# Patient Record
Sex: MALE | Race: WHITE | NOT HISPANIC OR LATINO | Employment: OTHER | URBAN - NONMETROPOLITAN AREA
[De-identification: names, ages, dates, MRNs, and addresses within clinical notes are randomized per-mention and may not be internally consistent; named-entity substitution may affect disease eponyms.]

---

## 2023-01-16 RX ORDER — DILTIAZEM HYDROCHLORIDE 120 MG/1
120 CAPSULE, EXTENDED RELEASE ORAL 2 TIMES DAILY
COMMUNITY

## 2023-01-16 RX ORDER — ASPIRIN 81 MG/1
81 TABLET ORAL DAILY
COMMUNITY
End: 2023-01-26

## 2023-01-16 RX ORDER — FUROSEMIDE 20 MG/1
20 TABLET ORAL DAILY
COMMUNITY

## 2023-01-16 RX ORDER — SIMVASTATIN 5 MG
5 TABLET ORAL NIGHTLY
COMMUNITY

## 2023-01-16 RX ORDER — OLMESARTAN MEDOXOMIL 20 MG/1
20 TABLET ORAL DAILY
COMMUNITY

## 2023-01-16 RX ORDER — METOPROLOL SUCCINATE 50 MG/1
50 TABLET, EXTENDED RELEASE ORAL 2 TIMES DAILY
COMMUNITY

## 2023-01-16 RX ORDER — SPIRONOLACTONE 25 MG/1
12.5 TABLET ORAL DAILY
COMMUNITY

## 2023-01-16 RX ORDER — DIGOXIN 250 MCG
250 TABLET ORAL
COMMUNITY

## 2023-01-26 ENCOUNTER — OFFICE VISIT (OUTPATIENT)
Dept: CARDIOLOGY | Facility: CLINIC | Age: 73
End: 2023-01-26
Payer: MEDICARE

## 2023-01-26 VITALS
BODY MASS INDEX: 24.2 KG/M2 | HEART RATE: 76 BPM | DIASTOLIC BLOOD PRESSURE: 66 MMHG | WEIGHT: 169 LBS | SYSTOLIC BLOOD PRESSURE: 118 MMHG | OXYGEN SATURATION: 98 % | HEIGHT: 70 IN

## 2023-01-26 DIAGNOSIS — I10 PRIMARY HYPERTENSION: ICD-10-CM

## 2023-01-26 DIAGNOSIS — I48.11 LONGSTANDING PERSISTENT ATRIAL FIBRILLATION: Primary | ICD-10-CM

## 2023-01-26 DIAGNOSIS — E78.2 MIXED HYPERLIPIDEMIA: ICD-10-CM

## 2023-01-26 PROCEDURE — 99204 OFFICE O/P NEW MOD 45 MIN: CPT | Performed by: INTERNAL MEDICINE

## 2023-01-26 PROCEDURE — 93000 ELECTROCARDIOGRAM COMPLETE: CPT | Performed by: INTERNAL MEDICINE

## 2023-01-26 NOTE — PROGRESS NOTES
Subjective:     Encounter Date:01/26/2023      Patient ID: Phil Fajardo is a 72 y.o. male with longstanding persistent atrial fibrillation, presenting today to establish care.    Referring Provider: Tomi Chávez MD  Reason for Referral: Atrial fibrillation    Chief Complaint: Here to establish care, atrial fibrillation    History of Present Illness    This is a 72-year-old male with longstanding persistent atrial fibrillation along with hypertension, hyperlipidemia, chronic kidney disease who presents today to establish care.  The patient has been followed by cardiology in [place].  He is known to have longstanding persistent atrial fibrillation.  He says that he underwent a cardioversion attempt on multiple occasions in the past but had a recurrence of atrial fibrillation shortly afterwards.  Therefore, he has been left in atrial fibrillation with heart rate being well controlled on AV marissa blocking agents.  He is chronically anticoagulated with Eliquis.  He is asymptomatic in regards to atrial fibrillation with no reports of palpitations, lightheadedness, dizziness, syncope.  He denies having chest pain, significant shortness of breath or dyspnea on exertion.  He denies having orthopnea, PND or significant edema.  He says that overall he seems to be doing well and feeling well at this time.  He has no complaints today and tolerates all of his medicines well.  Blood pressure is generally well controlled.  He also reports being on statin therapy.  He tolerates this well also.    The following portions of the patient's history were reviewed and updated as appropriate: allergies, current medications, past family history, past medical history, past social history, past surgical history and problem list.     Past Medical History:   Diagnosis Date   • A-fib (HCC)    • CHF (congestive heart failure) (HCC)    • Chronic kidney disease    • Hyperlipidemia    • Hypertension      Past Surgical History:   Procedure  Laterality Date   • CHOLECYSTECTOMY  05/17/2016   • HIP FUSION Right 1967       Current Outpatient Medications:   •  apixaban (ELIQUIS) 5 MG tablet tablet, Take 5 mg by mouth 2 (Two) Times a Day., Disp: , Rfl:   •  digoxin (LANOXIN) 250 MCG tablet, Take 250 mcg by mouth Daily., Disp: , Rfl:   •  dilTIAZem SR (CARDIZEM SR) 120 MG 12 hr capsule, Take 120 mg by mouth 2 (Two) Times a Day., Disp: , Rfl:   •  furosemide (LASIX) 20 MG tablet, Take 20 mg by mouth Daily., Disp: , Rfl:   •  metoprolol succinate XL (TOPROL-XL) 50 MG 24 hr tablet, Take 50 mg by mouth 2 (Two) Times a Day., Disp: , Rfl:   •  olmesartan (BENICAR) 20 MG tablet, Take 20 mg by mouth Daily., Disp: , Rfl:   •  Probiotic Product (PROBIOTIC-10 PO), Take 1 tablet by mouth Daily., Disp: , Rfl:   •  simvastatin (ZOCOR) 5 MG tablet, Take 5 mg by mouth Every Night., Disp: , Rfl:   •  spironolactone (ALDACTONE) 25 MG tablet, Take 12.5 mg by mouth Daily., Disp: , Rfl:   •  vitamin D3 125 MCG (5000 UT) capsule capsule, Take 5,000 Units by mouth Daily., Disp: , Rfl:     Allergies   Allergen Reactions   • Penicillins Swelling     Social History     Tobacco Use   • Smoking status: Never   • Smokeless tobacco: Never   Substance Use Topics   • Alcohol use: Defer     Family History   Problem Relation Age of Onset   • Emphysema Mother    • Heart attack Father      Review of Systems   Constitutional: Negative for chills, fever and weight loss.   HENT: Negative for congestion and hearing loss.    Eyes: Negative for blurred vision and pain.   Cardiovascular: Negative for chest pain, dyspnea on exertion, leg swelling, orthopnea, palpitations, paroxysmal nocturnal dyspnea and syncope.   Respiratory: Negative for cough, hemoptysis, shortness of breath and wheezing.    Endocrine: Negative for cold intolerance and heat intolerance.   Hematologic/Lymphatic: Negative for adenopathy and bleeding problem.   Skin: Negative for color change, poor wound healing and rash.    Musculoskeletal: Negative for myalgias and neck pain.   Gastrointestinal: Negative for abdominal pain, nausea and vomiting.   Genitourinary: Negative for dysuria and frequency.   Neurological: Negative for dizziness, focal weakness, headaches, light-headedness, loss of balance and numbness.   Psychiatric/Behavioral: Negative for altered mental status and memory loss.   Allergic/Immunologic: Negative for hives and persistent infections.       ECG 12 Lead    Date/Time: 1/26/2023 2:35 PM  Performed by: Mukul Mercado MD  Authorized by: Mukul Mercado MD   Previous ECG: no previous ECG available  Rhythm: atrial fibrillation  Rate: normal  BPM: 77  Conduction: conduction normal  QRS axis: normal  Other findings: non-specific ST-T wave changes and poor R wave progression    Clinical impression: abnormal EKG             Objective:     Vitals reviewed.   Constitutional:       General: Not in acute distress.     Appearance: Normal and healthy appearance. Well-developed. Not toxic-appearing or diaphoretic.   Eyes:      General: Lids are normal.      Extraocular Movements: Extraocular movements intact.      Pupils: Pupils are equal, round, and reactive to light.   HENT:      Head: Normocephalic and atraumatic.      Right Ear: External ear normal.      Left Ear: External ear normal.      Nose: Nose normal.    Mouth/Throat:      Mouth: Mucous membranes are not pale, not dry and not cyanotic.   Neck:      Thyroid: No thyroid mass or thyromegaly.      Vascular: No carotid bruit, hepatojugular reflux or JVD.      Trachea: No tracheal deviation.      Lymphadenopathy: No cervical adenopathy.   Pulmonary:      Effort: Pulmonary effort is normal. No accessory muscle usage or respiratory distress.      Breath sounds: Normal breath sounds. No wheezing. No rhonchi. No rales.   Chest:      Chest wall: Not tender to palpatation.   Cardiovascular:      Normal rate. Irregularly irregular rhythm.      Murmurs: There is no  "murmur.      No gallop.   Pulses:     Intact distal pulses.   Edema:     Peripheral edema absent.   Abdominal:      General: Bowel sounds are normal. There is no distension or abdominal bruit.      Palpations: Abdomen is soft.      Tenderness: There is no abdominal tenderness.   Musculoskeletal: Normal range of motion.         General: No tenderness or deformity.      Extremities: No clubbing present.     Cervical back: Normal range of motion and neck supple. No edema. Skin:     General: Skin is warm and dry. There is no cyanosis.      Findings: No erythema or rash.   Neurological:      General: No focal deficit present.      Mental Status: Oriented to person, place, and time and oriented to person, place and time.      Cranial Nerves: No cranial nerve deficit.   Psychiatric:         Attention and Perception: Attention normal.         Mood and Affect: Mood normal.         Speech: Speech normal.         Behavior: Behavior normal. Behavior is cooperative.         Thought Content: Thought content normal.       /66   Pulse 76   Ht 177.8 cm (70\")   Wt 76.7 kg (169 lb)   SpO2 98%   BMI 24.25 kg/m²     Data/Lab Review:     Creatinine on 08/11/2020: 2.77    =======================================================    Cardiac monitor on 11/12/2021 in [place]:        Previous records from [place] as follows:          Assessment:          Diagnosis Plan   1. Longstanding persistent atrial fibrillation (HCC)  ECG 12 Lead      2. Primary hypertension        3. Mixed hyperlipidemia             Plan:       1.  Longstanding persistent atrial fibrillation: The patient is clinically stable at this time.  He is asymptomatic in regards to atrial fibrillation and his heart rate is currently well controlled.  Continue therapeutic anticoagulation.  Given the fact the patient is chronically anticoagulated, we will discontinue aspirin therapy.  Current dosing of 5 mg twice daily is appropriate for the patient.  He also remains on " beta-blocker therapy.    2.  Essential hypertension: Blood pressure is currently well controlled.  Continue to monitor.  Continue current medication.    3.  Mixed hyperlipidemia: The patient is on statin therapy.  His lipids are followed by his primary care provider.    We will plan to see the patient again in 6 months unless otherwise needed sooner.

## 2023-03-29 ENCOUNTER — TELEPHONE (OUTPATIENT)
Dept: CARDIOLOGY | Facility: CLINIC | Age: 73
End: 2023-03-29
Payer: COMMERCIAL

## 2023-03-29 NOTE — TELEPHONE ENCOUNTER
Patient is to be scheduled for open right inguinal hernia repair with mesh and will need to hold his Eliquis 48 hours before procedure. Please advise.

## 2023-05-30 ENCOUNTER — TELEPHONE (OUTPATIENT)
Dept: CARDIOLOGY | Facility: CLINIC | Age: 73
End: 2023-05-30

## 2023-05-30 NOTE — TELEPHONE ENCOUNTER
Caller: Phil Fajardo    Relationship: Self    Best call back number:[phone]  What is the best time to reach you: ANY   Who are you requesting to speak with (clinical staff, provider,  specific staff member):CLINICAL     What was the call regarding: PT SHOULD  BE HAVING HERNIA SURGERY IN THE NEXT MONTH AND THE DR WANTS AN OK FROM DR NATH THAT PT WILL BE CLEARED FOR SURGERY.     Is it okay if the provider responds through MyChart: PLEASE CALL

## 2023-06-12 ENCOUNTER — TELEPHONE (OUTPATIENT)
Dept: CARDIOLOGY | Facility: CLINIC | Age: 73
End: 2023-06-12
Payer: COMMERCIAL

## 2023-06-12 NOTE — TELEPHONE ENCOUNTER
Caller: MALCOLM    Relationship: Provider    Best call back number: [phone]    What form or medical record are you requesting: CARDIAC CLEARANCE    Who is requesting this form or medical record from you: DR DAVEY    How would you like to receive the form or medical records (pick-up, mail, fax): FAX  If fax, what is the fax number: [phone]    Timeframe paperwork needed: THIS WEEK    Additional notes: CARDIAC CLEARANCE WAS SENT OVER BUT WAS NOT SIGNED, PLEASE SIGN AND FAX BACK OVER, THANK YOU

## 2023-06-16 PROBLEM — N17.9 AKI (ACUTE KIDNEY INJURY): Status: ACTIVE | Noted: 2023-06-16

## 2023-06-16 PROBLEM — R33.8 ACUTE URINARY RETENTION: Status: ACTIVE | Noted: 2023-06-16

## 2023-06-16 PROBLEM — N39.0 RECURRENT UTI: Status: ACTIVE | Noted: 2023-06-16

## 2023-06-16 PROBLEM — N13.9 OBSTRUCTIVE UROPATHY: Status: ACTIVE | Noted: 2023-06-16

## 2023-06-16 PROBLEM — Z79.01 CHRONIC ANTICOAGULATION: Status: ACTIVE | Noted: 2023-06-16

## 2023-06-16 PROBLEM — R31.0 GROSS HEMATURIA: Status: ACTIVE | Noted: 2023-06-16

## 2023-06-16 PROBLEM — N35.919 URETHRAL STRICTURE: Status: ACTIVE | Noted: 2023-06-16

## 2023-06-16 PROBLEM — I48.0 PAF (PAROXYSMAL ATRIAL FIBRILLATION): Status: ACTIVE | Noted: 2023-06-16

## 2023-06-16 PROBLEM — I10 ESSENTIAL HYPERTENSION: Status: ACTIVE | Noted: 2023-06-16

## 2023-06-17 PROBLEM — E87.5 HYPERKALEMIA: Status: ACTIVE | Noted: 2023-06-17

## 2023-08-24 ENCOUNTER — OFFICE VISIT (OUTPATIENT)
Dept: CARDIOLOGY | Facility: CLINIC | Age: 73
End: 2023-08-24
Payer: MEDICARE

## 2023-08-24 VITALS
HEIGHT: 70 IN | HEART RATE: 79 BPM | BODY MASS INDEX: 23.34 KG/M2 | SYSTOLIC BLOOD PRESSURE: 108 MMHG | OXYGEN SATURATION: 100 % | WEIGHT: 163 LBS | DIASTOLIC BLOOD PRESSURE: 68 MMHG

## 2023-08-24 DIAGNOSIS — I48.11 LONGSTANDING PERSISTENT ATRIAL FIBRILLATION: Primary | ICD-10-CM

## 2023-08-24 DIAGNOSIS — I10 ESSENTIAL HYPERTENSION: ICD-10-CM

## 2023-08-24 PROCEDURE — 3074F SYST BP LT 130 MM HG: CPT | Performed by: INTERNAL MEDICINE

## 2023-08-24 PROCEDURE — 3078F DIAST BP <80 MM HG: CPT | Performed by: INTERNAL MEDICINE

## 2023-08-24 PROCEDURE — 99214 OFFICE O/P EST MOD 30 MIN: CPT | Performed by: INTERNAL MEDICINE

## 2023-08-24 PROCEDURE — 93000 ELECTROCARDIOGRAM COMPLETE: CPT | Performed by: INTERNAL MEDICINE

## 2023-08-24 PROCEDURE — 1159F MED LIST DOCD IN RCRD: CPT | Performed by: INTERNAL MEDICINE

## 2023-08-24 PROCEDURE — 1160F RVW MEDS BY RX/DR IN RCRD: CPT | Performed by: INTERNAL MEDICINE

## 2023-08-24 NOTE — PROGRESS NOTES
Subjective:     Encounter Date:08/24/2023      Patient ID: Phil Fajardo is a 73 y.o. male with longstanding persistent atrial fibrillation, hypertension, presenting today for follow-up.    Chief Complaint: Here for follow-up of atrial fibrillation    History of Present Illness    This patient presents today for routine follow-up.  He has a history of longstanding persistent atrial fibrillation and remains chronically anticoagulated with apixaban.  He denies have any significant bleeding issues.  He is also on digoxin, diltiazem, Toprol-XL.  He is asymptomatic in terms of atrial fibrillation with no palpitations, lightheadedness, dizziness, syncope.  He also denies having chest pain.  He describes his breathing as being stable.  He denies having any lower extremity edema.  His blood pressure is generally well controlled on his current medications.  Overall, he says that he is doing well and feeling well at this time.      Current Outpatient Medications:     apixaban (ELIQUIS) 5 MG tablet tablet, Take 1 tablet by mouth 2 (Two) Times a Day., Disp: , Rfl:     digoxin (LANOXIN) 250 MCG tablet, Take 1 tablet by mouth Daily., Disp: , Rfl:     dilTIAZem HCl  MG tablet sustained-release 24 hour, Take 120 mg by mouth Daily., Disp: , Rfl:     furosemide (LASIX) 20 MG tablet, Take 1 tablet by mouth Daily., Disp: , Rfl:     metoprolol succinate XL (TOPROL-XL) 50 MG 24 hr tablet, Take 1 tablet by mouth 2 (Two) Times a Day., Disp: , Rfl:     olmesartan (BENICAR) 20 MG tablet, Take 1 tablet by mouth Daily., Disp: , Rfl:     Probiotic Product (PROBIOTIC-10 PO), Take 1 tablet by mouth Daily., Disp: , Rfl:     simvastatin (ZOCOR) 5 MG tablet, Take 1 tablet by mouth Every Night., Disp: , Rfl:     spironolactone (ALDACTONE) 25 MG tablet, Take 0.5 tablets by mouth Daily., Disp: , Rfl:     vitamin D3 125 MCG (5000 UT) capsule capsule, Take 1 capsule by mouth Daily., Disp: , Rfl:     Allergies   Allergen Reactions    Penicillins  Swelling     Social History     Tobacco Use    Smoking status: Never    Smokeless tobacco: Never   Substance Use Topics    Alcohol use: Defer     Review of Systems   Constitutional: Negative for fever and weight loss.   Cardiovascular:  Negative for chest pain, dyspnea on exertion, leg swelling, orthopnea, palpitations, paroxysmal nocturnal dyspnea and syncope.   Respiratory:  Negative for cough, shortness of breath and wheezing.    Hematologic/Lymphatic: Negative for bleeding problem. Does not bruise/bleed easily.   Gastrointestinal:  Negative for abdominal pain, hematemesis, hematochezia, melena, nausea and vomiting.   Neurological:  Negative for dizziness, headaches and loss of balance.       ECG 12 Lead    Date/Time: 8/24/2023 3:05 PM  Performed by: Mukul Mercado MD  Authorized by: Mukul Mercado MD   Comparison: compared with previous ECG from 1/26/2023  Similar to previous ECG  Rhythm: atrial fibrillation  Rate: normal  BPM: 70  Conduction: conduction normal  QRS axis: normal  Other findings: non-specific ST-T wave changes and poor R wave progression    Clinical impression: abnormal EKG         Objective:     Vitals reviewed.   Constitutional:       General: Not in acute distress.     Appearance: Well-developed and not in distress.   Eyes:      Extraocular Movements: Extraocular movements intact.   HENT:      Head: Normocephalic and atraumatic.   Pulmonary:      Effort: Pulmonary effort is normal.      Breath sounds: Normal breath sounds. No wheezing. No rhonchi. No rales.   Cardiovascular:      Normal rate. Irregularly irregular rhythm.      Murmurs: There is no murmur.      No gallop.    Pulses:     Intact distal pulses.   Edema:     Peripheral edema absent.   Abdominal:      General: Bowel sounds are normal. There is no distension.      Palpations: Abdomen is soft.      Tenderness: There is no abdominal tenderness.   Skin:     General: Skin is warm and dry. There is no cyanosis.       "Findings: No erythema or rash.   Neurological:      Mental Status: Alert and oriented to person, place, and time.      Cranial Nerves: No cranial nerve deficit.     /68   Pulse 79   Ht 177.8 cm (70\")   Wt 73.9 kg (163 lb)   SpO2 100%   BMI 23.39 kg/mý     Data/Lab Review:     Lab Results   Component Value Date    WBC 13.75 (H) 06/18/2023    HGB 11.3 (L) 06/18/2023    HCT 35.5 (L) 06/18/2023    .7 (H) 06/18/2023     06/18/2023     Lab Results   Component Value Date    GLUCOSE 107 (H) 06/19/2023    CALCIUM 8.9 06/19/2023     06/19/2023    K 5.2 06/19/2023    CO2 15.0 (L) 06/19/2023     (H) 06/19/2023    BUN 31 (H) 06/19/2023    CREATININE 2.01 (H) 06/19/2023    EGFR 34.6 (L) 06/19/2023    BCR 15.4 06/19/2023    ANIONGAP 10.0 06/19/2023         Assessment:          Diagnosis Plan   1. Longstanding persistent atrial fibrillation  ECG 12 Lead      2. Essential hypertension             Plan:       1.  Longstanding persistent atrial fibrillation: The patient remains in atrial fibrillation with heart rate well controlled and no symptoms reported.  Given an elevated risk profile, he does remain anticoagulated with Eliquis.  He is tolerating this medication well.    [id number] SCORE   [id number] Score: 3 (8/24/2023  2:47 PM)    2.  Essential hypertension: Blood pressure remains well controlled.  Continue current medications with Toprol-XL, olmesartan, spironolactone.    We will see the patient again in 12 months unless otherwise needed sooner.       "

## 2025-01-16 ENCOUNTER — OFFICE VISIT (OUTPATIENT)
Dept: CARDIOLOGY | Facility: CLINIC | Age: 75
End: 2025-01-16
Payer: MEDICARE

## 2025-01-16 VITALS
HEART RATE: 66 BPM | HEIGHT: 70 IN | WEIGHT: 192 LBS | SYSTOLIC BLOOD PRESSURE: 120 MMHG | BODY MASS INDEX: 27.49 KG/M2 | OXYGEN SATURATION: 99 % | DIASTOLIC BLOOD PRESSURE: 74 MMHG

## 2025-01-16 DIAGNOSIS — I48.11 LONGSTANDING PERSISTENT ATRIAL FIBRILLATION: Primary | ICD-10-CM

## 2025-01-16 DIAGNOSIS — I10 ESSENTIAL HYPERTENSION: ICD-10-CM

## 2025-01-16 PROCEDURE — 93000 ELECTROCARDIOGRAM COMPLETE: CPT | Performed by: INTERNAL MEDICINE

## 2025-01-16 PROCEDURE — 3078F DIAST BP <80 MM HG: CPT | Performed by: INTERNAL MEDICINE

## 2025-01-16 PROCEDURE — 3074F SYST BP LT 130 MM HG: CPT | Performed by: INTERNAL MEDICINE

## 2025-01-16 PROCEDURE — 99213 OFFICE O/P EST LOW 20 MIN: CPT | Performed by: INTERNAL MEDICINE

## 2025-01-16 PROCEDURE — 1160F RVW MEDS BY RX/DR IN RCRD: CPT | Performed by: INTERNAL MEDICINE

## 2025-01-16 PROCEDURE — 1159F MED LIST DOCD IN RCRD: CPT | Performed by: INTERNAL MEDICINE

## 2025-01-16 NOTE — PROGRESS NOTES
Subjective:     Encounter Date:01/16/2025      Patient ID: Phil Fajardo is a 74 y.o. male with longstanding persistent atrial fibrillation, hypertension, presenting today for routine follow-up.    Chief Complaint: Here for routine follow-up of atrial fibrillation    History of Present Illness    This patient presents today for follow-up of atrial fibrillation.  He has a history of longstanding persistent atrial fibrillation.  He remains anticoagulated with Eliquis and also takes digoxin, diltiazem, metoprolol.  He denies having any symptoms of atrial fibrillation with no reports of palpitations, chest discomfort, lightheadedness, dizziness, syncope, significant shortness of breath or dyspnea with exertion.  He also denies having lower extremity edema.  He denies having bleeding issues with Eliquis.  His blood pressure is generally well-controlled.  He is tolerating all of his medications well.      Current Outpatient Medications:     apixaban (ELIQUIS) 5 MG tablet tablet, Take 1 tablet by mouth 2 (Two) Times a Day., Disp: , Rfl:     digoxin (LANOXIN) 250 MCG tablet, Take 1 tablet by mouth Daily., Disp: , Rfl:     dilTIAZem HCl  MG tablet sustained-release 24 hour, Take 120 mg by mouth Daily., Disp: , Rfl:     furosemide (LASIX) 20 MG tablet, Take 1 tablet by mouth Daily., Disp: , Rfl:     metoprolol succinate XL (TOPROL-XL) 50 MG 24 hr tablet, Take 1 tablet by mouth 2 (Two) Times a Day., Disp: , Rfl:     Probiotic Product (PROBIOTIC-10 PO), Take 1 tablet by mouth Daily., Disp: , Rfl:     simvastatin (ZOCOR) 5 MG tablet, Take 1 tablet by mouth Every Night., Disp: , Rfl:     spironolactone (ALDACTONE) 25 MG tablet, Take 0.5 tablets by mouth Daily., Disp: , Rfl:     vitamin D3 125 MCG (5000 UT) capsule capsule, Take 1 capsule by mouth Daily., Disp: , Rfl:     Allergies   Allergen Reactions    Penicillins Swelling     Social History     Tobacco Use    Smoking status: Never    Smokeless tobacco: Never  "  Substance Use Topics    Alcohol use: Defer     Review of Systems   Cardiovascular:  Negative for chest pain, dyspnea on exertion, leg swelling, orthopnea, palpitations, paroxysmal nocturnal dyspnea and syncope.   Respiratory:  Negative for cough, shortness of breath and wheezing.    Hematologic/Lymphatic: Negative for bleeding problem. Does not bruise/bleed easily.   Gastrointestinal:  Negative for abdominal pain, hematemesis, hematochezia, melena, nausea and vomiting.   Neurological:  Negative for dizziness and light-headedness.       ECG 12 Lead    Date/Time: 1/16/2025 3:14 PM  Performed by: Mukul Mercado MD    Authorized by: Mukul Mercado MD  Comparison: compared with previous ECG from 8/24/2023  Similar to previous ECG  Rhythm: sinus rhythm  Rate: normal  BPM: 93  Conduction: left anterior fascicular block  QRS axis: left  Other findings: non-specific ST-T wave changes and poor R wave progression    Clinical impression: abnormal EKG           Objective:     Vitals reviewed.   Constitutional:       General: Not in acute distress.     Appearance: Well-developed and not in distress.   HENT:      Head: Normocephalic and atraumatic.   Neck:      Vascular: No carotid bruit or JVD.   Pulmonary:      Effort: Pulmonary effort is normal.      Breath sounds: Normal breath sounds. No wheezing. No rhonchi. No rales.   Cardiovascular:      Normal rate. Irregularly irregular rhythm.      Murmurs: There is no murmur.      No gallop.    Pulses:     Intact distal pulses.   Edema:     Peripheral edema absent.   Abdominal:      General: Bowel sounds are normal. There is no distension.      Palpations: Abdomen is soft.      Tenderness: There is no abdominal tenderness.   Skin:     General: Skin is warm and dry. There is no cyanosis.      Findings: No erythema or rash.   Neurological:      Mental Status: Alert. Mental status is at baseline.       /74   Pulse 66   Ht 177.8 cm (70\")   Wt 87.1 kg (192 lb)  "  SpO2 99%   BMI 27.55 kg/m²     Data/Lab Review: Nothing new to review at this time.        Assessment:          Diagnosis Plan   1. Longstanding persistent atrial fibrillation  ECG 12 Lead      2. Essential hypertension             Plan:       1.  Longstanding persistent atrial fibrillation: The patient remains in atrial fibrillation.  He is asymptomatic and his heart rate remains well-controlled.  He does continue digoxin, Toprol-XL, Cardizem and is also anticoagulated with Eliquis.  He is tolerating all therapies well.    [id number] SCORE   [id number] Score: 3 (1/16/2025  3:01 PM)     2.  Essential hypertension: The patient's blood pressure remains well-controlled at this time.  Continue current medications.     I will plan to see the patient again in 12 months unless otherwise needed sooner.